# Patient Record
Sex: FEMALE | Race: WHITE | Employment: FULL TIME | URBAN - METROPOLITAN AREA
[De-identification: names, ages, dates, MRNs, and addresses within clinical notes are randomized per-mention and may not be internally consistent; named-entity substitution may affect disease eponyms.]

---

## 2021-04-27 ENCOUNTER — APPOINTMENT (OUTPATIENT)
Dept: GENERAL RADIOLOGY | Age: 41
End: 2021-04-27
Payer: COMMERCIAL

## 2021-04-27 ENCOUNTER — HOSPITAL ENCOUNTER (EMERGENCY)
Age: 41
Discharge: HOME | End: 2021-04-27
Payer: COMMERCIAL

## 2021-04-27 VITALS
HEIGHT: 66 IN | TEMPERATURE: 98.6 F | DIASTOLIC BLOOD PRESSURE: 77 MMHG | WEIGHT: 164 LBS | HEART RATE: 58 BPM | RESPIRATION RATE: 20 BRPM | BODY MASS INDEX: 26.36 KG/M2 | OXYGEN SATURATION: 100 % | SYSTOLIC BLOOD PRESSURE: 116 MMHG

## 2021-04-27 DIAGNOSIS — S93.411A SPRAIN OF CALCANEOFIBULAR LIGAMENT OF RIGHT ANKLE, INITIAL ENCOUNTER: Primary | ICD-10-CM

## 2021-04-27 PROCEDURE — 99284 EMERGENCY DEPT VISIT MOD MDM: CPT

## 2021-04-27 PROCEDURE — 73610 X-RAY EXAM OF ANKLE: CPT

## 2021-04-27 ASSESSMENT — ENCOUNTER SYMPTOMS
COUGH: 0
VOMITING: 0
SHORTNESS OF BREATH: 0
COLOR CHANGE: 0
DIARRHEA: 0
WHEEZING: 0
RHINORRHEA: 0
ABDOMINAL PAIN: 0
NAUSEA: 0
CHEST TIGHTNESS: 0
BACK PAIN: 0

## 2021-04-27 ASSESSMENT — PAIN SCALES - GENERAL: PAINLEVEL_OUTOF10: 5

## 2021-04-27 ASSESSMENT — PAIN DESCRIPTION - LOCATION: LOCATION: ANKLE

## 2021-04-27 NOTE — ED PROVIDER NOTES
[address] [facility] ED  EMERGENCY DEPARTMENT ENCOUNTER      Pt Name: Anais Pearce  MRN: [id number]  Lucy[place]ie 1980  Date of evaluation: 4/27/2021  Provider: John Cleaning       Chief Complaint   Patient presents with    Ankle Pain     twisted right ankle yestreday         HISTORY OF PRESENT ILLNESS   (Location/Symptom, Timing/Onset,Context/Setting, Quality, Duration, Modifying Factors, Severity)  Note limiting factors. Anais Pearce is a 46 y.o. female who presents to the emergency department for complaint of right ankle pain swelling and tenderness. Patient states she twisted her ankle yesterday evening while with a heavy box into a dumpster. She states she felt an immediate sharp pain. She states she was able to put some weight on it but over the course of the night has been more painful having difficulty putting weight especially near the heel. She rates the pain as a 5 out of 10 at rest aching throbbing sensation made worse with activity and weightbearing. She denies any open wounds denies falling or other injuries to any other area of her body. She took Motrin today with some relief. Nursing Notes were reviewed. REVIEW OF SYSTEMS    (2-9 systems for level 4, 10 or more for level 5)     Review of Systems   Constitutional: Negative for activity change, appetite change, chills, diaphoresis, fatigue and fever. HENT: Negative for congestion and rhinorrhea. Eyes: Negative for visual disturbance. Respiratory: Negative for cough, chest tightness, shortness of breath and wheezing. Cardiovascular: Negative for chest pain and palpitations. Gastrointestinal: Negative for abdominal pain, diarrhea, nausea and vomiting. Genitourinary: Negative for difficulty urinating, dysuria, flank pain, frequency and urgency. Musculoskeletal: Positive for arthralgias, gait problem, joint swelling and myalgias. Negative for back pain, neck pain and neck stiffness. diaphoretic. HENT:      Head: Normocephalic and atraumatic. Right Ear: External ear normal.      Left Ear: External ear normal.   Eyes:      General:         Right eye: No discharge. Left eye: No discharge. Conjunctiva/sclera: Conjunctivae normal.   Neck:      Musculoskeletal: Normal range of motion and neck supple. Cardiovascular:      Rate and Rhythm: Normal rate and regular rhythm. Pulses: Normal pulses. Pulmonary:      Effort: Pulmonary effort is normal.   Musculoskeletal: Normal range of motion. General: Swelling, tenderness and signs of injury present. No deformity. Right knee: Normal.      Right ankle: She exhibits swelling. She exhibits normal range of motion, no ecchymosis and no deformity. Tenderness. CF ligament tenderness found. No lateral malleolus and no medial malleolus tenderness found. Feet:    Skin:     General: Skin is warm and dry. Capillary Refill: Capillary refill takes less than 2 seconds. Neurological:      General: No focal deficit present. Mental Status: She is alert and oriented to person, place, and time. Mental status is at baseline. Cranial Nerves: No cranial nerve deficit. Sensory: No sensory deficit. Motor: No weakness.       Coordination: Coordination normal.         RESULTS     EKG: All EKG's are interpreted by the Emergency Department Physician who either signs or Co-signsthis chart in the absence of a cardiologist.        RADIOLOGY:   [facility] such as CT, Ultrasound and MRI are read by the radiologist. [facility] radiographic images are visualized and preliminarily interpreted by the emergency physician with the below findings:    3 view x-ray of the right ankle negative for acute fracture displacement or other bony process    Interpretation per the Radiologist below, if available at the time ofthis note:    XR ANKLE RIGHT (MIN 3 VIEWS)    (Results Pending)         ED BEDSIDE ULTRASOUND:   Performed by ED Physician - none    LABS:  Labs Reviewed - No data to display    All other labs were within normal range or not returned as of this dictation. EMERGENCY DEPARTMENT COURSE and DIFFERENTIAL DIAGNOSIS/MDM:   Vitals:    Vitals:    04/27/21 0848   BP: 116/77   Pulse: 58   Resp: 20   Temp: 98.6 °F (37 °C)   TempSrc: Oral   SpO2: 100%   Weight: 164 lb (74.4 kg)   Height: 5' 6\" (1.676 m)            MDM patient is afebrile nontoxic no acute distress hemodynamically stable. Patient has slight swelling of the right ankle palpable tenderness no bony tenderness. X-ray negative for acute fracture. Patient's symptoms presentation suggestive of sprain to the right ankle. Should be provided with crutches and a walking boot for support. She is from out of town and directed to follow-up with her primary care provider when she arrives home later this week for further evaluation. Return to the ER if any onset of new concerns and worsening condition specifically severe discoloration. Patient verbalized understanding of all given instruction education. CRITICAL CARE TIME       CONSULTS:  None    PROCEDURES:  Unless otherwise noted below, none     Procedures    FINAL IMPRESSION      1.  Sprain of calcaneofibular ligament of right ankle, initial encounter          DISPOSITION/PLAN   DISPOSITION Discharge - Pending Orders Complete 04/27/2021 09:15:54 AM      PATIENT REFERRED TO:  Your PCP  Follow up within the week once you return home  Schedule an appointment as soon as possible for a visit in 1 week        DISCHARGE MEDICATIONS:  New Prescriptions    No medications on file          (Please notethat portions of this note were completed with a voice recognition program.  Efforts were made to edit the dictations but occasionally words are mis-transcribed.)    TORI Marques CNP (electronically signed)  Attending Emergency Physician         TORI Marques CNP  04/27/21 9520

## 2021-04-27 NOTE — ED TRIAGE NOTES
Pt  Comes to er with c/o twisting her ankle while taking garbe out yesterday. Right ankle is swollen.  Pedal pulse is present